# Patient Record
Sex: FEMALE
[De-identification: names, ages, dates, MRNs, and addresses within clinical notes are randomized per-mention and may not be internally consistent; named-entity substitution may affect disease eponyms.]

---

## 2021-12-16 ENCOUNTER — APPOINTMENT (OUTPATIENT)
Dept: PULMONOLOGY | Facility: CLINIC | Age: 21
End: 2021-12-16
Payer: COMMERCIAL

## 2021-12-16 VITALS
WEIGHT: 120 LBS | DIASTOLIC BLOOD PRESSURE: 76 MMHG | TEMPERATURE: 97.6 F | HEART RATE: 105 BPM | OXYGEN SATURATION: 100 % | SYSTOLIC BLOOD PRESSURE: 114 MMHG | HEIGHT: 66 IN | BODY MASS INDEX: 19.29 KG/M2

## 2021-12-16 DIAGNOSIS — Z77.120 CONTACT WITH AND (SUSPECTED) EXPOSURE TO MOLD (TOXIC): ICD-10-CM

## 2021-12-16 PROBLEM — Z00.00 ENCOUNTER FOR PREVENTIVE HEALTH EXAMINATION: Status: ACTIVE | Noted: 2021-12-16

## 2021-12-16 PROCEDURE — 99203 OFFICE O/P NEW LOW 30 MIN: CPT

## 2021-12-16 NOTE — HISTORY OF PRESENT ILLNESS
[TextBox_4] : 22 yo F with no medical history here because her college dorm was noted to have mold. Has gómez the past told that she has environemental asthma but has not used the inhaler in years. now notes congestion, cough, and feels like she has difficulty breathing in her dorm but breathes better outside. does exercise but feels lung capacity is decreased. \par She had an allergy test yesterday and she not test opsitive for allergy to mold. also had spirometry and FeNO with the allergist and was told it was normal. Also reports that she is seeing a mold specialist who will be doing blood and urine test to see if she was exposed to mold.

## 2021-12-16 NOTE — PHYSICAL EXAM
[No Acute Distress] : no acute distress [I] : Mallampati Class: I [Supple] : supple [Normal Rate/Rhythm] : normal rate/rhythm [Normal S1, S2] : normal s1, s2 [No Resp Distress] : no resp distress [Clear to Auscultation Bilaterally] : clear to auscultation bilaterally [Gait - Sufficient For Exercise Testing] : gait sufficient for exercise testing [Oriented x3] : oriented x3 [Normal Affect] : normal affect

## 2021-12-16 NOTE — REVIEW OF SYSTEMS
[Negative] : Endocrine [Cough] : no cough [SOB on Exertion] : no sob on exertion [GERD] : no gerd [Abdominal Pain] : no abdominal pain [Diarrhea] : no diarrhea [Headache] : no headache [Dizziness] : no dizziness

## 2021-12-16 NOTE — ASSESSMENT
[FreeTextEntry1] : Impression/Plan:\par 1. Potential mold exposure\par has respiratory symptoms when living in the dorms but not when outside\par she has had a number of tests done at outside facility and karen asked that she have them faxed over to me\par will get PFT, if abnormal will get CT chest \par \par

## 2021-12-20 ENCOUNTER — APPOINTMENT (OUTPATIENT)
Dept: PULMONOLOGY | Facility: CLINIC | Age: 21
End: 2021-12-20